# Patient Record
Sex: MALE | Race: WHITE | NOT HISPANIC OR LATINO | URBAN - METROPOLITAN AREA
[De-identification: names, ages, dates, MRNs, and addresses within clinical notes are randomized per-mention and may not be internally consistent; named-entity substitution may affect disease eponyms.]

---

## 2019-11-29 ENCOUNTER — EMERGENCY (EMERGENCY)
Facility: HOSPITAL | Age: 54
LOS: 1 days | Discharge: HOME | End: 2019-11-29
Attending: EMERGENCY MEDICINE
Payer: MEDICARE

## 2019-11-29 VITALS
SYSTOLIC BLOOD PRESSURE: 136 MMHG | RESPIRATION RATE: 17 BRPM | HEART RATE: 98 BPM | TEMPERATURE: 98 F | OXYGEN SATURATION: 97 % | DIASTOLIC BLOOD PRESSURE: 98 MMHG

## 2019-11-29 VITALS
WEIGHT: 259.93 LBS | DIASTOLIC BLOOD PRESSURE: 100 MMHG | RESPIRATION RATE: 17 BRPM | HEIGHT: 74 IN | OXYGEN SATURATION: 95 % | TEMPERATURE: 97 F | HEART RATE: 100 BPM | SYSTOLIC BLOOD PRESSURE: 152 MMHG

## 2019-11-29 PROCEDURE — 99282 EMERGENCY DEPT VISIT SF MDM: CPT

## 2019-11-29 PROCEDURE — 82962 GLUCOSE BLOOD TEST: CPT

## 2019-11-29 PROCEDURE — 99285 EMERGENCY DEPT VISIT HI MDM: CPT

## 2019-11-29 NOTE — ED PROVIDER NOTE - OBJECTIVE STATEMENT
54 year old male PMH HTN, PE on xarelto, s/p appe 2 weeks ago coming in with oxycodone overdose. Pt states he took hald an oxy that he had for post surgical pain, a hlaf a glass of wine and states he shaved and then fell asleep in the bathroom. states his friend who was with him had a difficult time waking him up so called EMS. pt states he didn't fall at all, has no complaints at this time. States he told EMS he didn't want to come at all. Pt also states didn't sleep last night since he is taking care of friend whose   recently.

## 2019-11-29 NOTE — ED PROVIDER NOTE - PATIENT PORTAL LINK FT
You can access the FollowMyHealth Patient Portal offered by [facility] by registering at the following website: [email or web address]. By joining [employer]’s FollowMyHealth portal, you will also be able to view your health information using other applications (apps) compatible with our system.

## 2019-11-29 NOTE — ED PROVIDER NOTE - CLINICAL SUMMARY MEDICAL DECISION MAKING FREE TEXT BOX
54 year old male with accidental overdose of oxycodone. vitals WNL. PE as above.  pt with no complaints at this time. doesn't want any workup. no signs of trauma on exam. normal neuro exam. ambulatory with normal gait. will dc. f/u PMD. return precautions given.

## 2019-11-29 NOTE — ED PROVIDER NOTE - PHYSICAL EXAMINATION
no signs of trauma to head/neck/trunk/or extremities.  PERRLA. EOMI. AAOx4  ambulatory in ED with normal gait.

## 2021-04-30 ENCOUNTER — EMERGENCY (EMERGENCY)
Facility: HOSPITAL | Age: 56
LOS: 1 days | Discharge: LEFT BEFORE DISCHARGE | End: 2021-04-30
Attending: EMERGENCY MEDICINE
Payer: MEDICARE

## 2021-04-30 VITALS
TEMPERATURE: 98 F | HEIGHT: 74 IN | OXYGEN SATURATION: 96 % | SYSTOLIC BLOOD PRESSURE: 162 MMHG | HEART RATE: 101 BPM | RESPIRATION RATE: 17 BRPM | DIASTOLIC BLOOD PRESSURE: 102 MMHG

## 2021-04-30 PROCEDURE — 99283 EMERGENCY DEPT VISIT LOW MDM: CPT

## 2021-04-30 NOTE — ED PROVIDER NOTE - CLINICAL SUMMARY MEDICAL DECISION MAKING FREE TEXT BOX
Discussed with patient at length about possible overdose reversed with Narcan even though patient adamantly denies using any drugs vs any medical conditions such as MI, cardiac arrythmia or others that may cause syncope or AMS. Patient states he had full evaluation by PMD and cardiologist at [facility]. Patient is asymptomatic and doesn't want to stay for further evaluation. Refuses/declines EKG, blood work, radiology studies, and admission. Patient alert and oriented x4. Understands risk of leaving AMA.

## 2021-04-30 NOTE — ED PROVIDER NOTE - NSFOLLOWUPCLINICS_GEN_ALL_ED_FT
[facility]  [facility]  [address] - [address]nd [place] [address]  Phone: [phone]  Fax: [phone]

## 2021-04-30 NOTE — ED PROVIDER NOTE - PHYSICAL EXAMINATION
In ER, vital signs with heart rate of 101, blood pressure of 162/102, otherwise exam within normal limits.

## 2021-04-30 NOTE — ED ADULT TRIAGE NOTE - CHIEF COMPLAINT QUOTE
unresponsive and lethargic, given narcan 2mg IV, patient alert and awake now. denies any suicidal ideation.
Walk in

## 2021-04-30 NOTE — ED PROVIDER NOTE - NSFOLLOWUPINSTRUCTIONS_ED_ALL_ED_FT
You decided to leave against medical advice even though you understood the risks of doing [so, including heart attach, fatal cardiac arrhythmia, permanent disability and death.   Take meds as prescribed by your doctor.  Follow up with your doctor/cardiologist or in the Clinic as discussed within 1 week.  Return to the ER for any concerns at any time.

## 2021-04-30 NOTE — ED ADULT NURSE NOTE - ED STAT RN HANDOFF DETAILS
pt is alert times 3 , signed AMA - pt  verbalizes risks for leaving and benefits of staying - pt signed AMA

## 2021-04-30 NOTE — ED ADULT NURSE NOTE - CHIEF COMPLAINT QUOTE
unresponsive and lethargic, given narcan 2mg IV, patient alert and awake now. denies any suicidal ideation.

## 2021-04-30 NOTE — ED PROVIDER NOTE - OBJECTIVE STATEMENT
55 y/o M pt with a history of PE on Xarelto, HTN, obesity, had recent ER visit at [facility] for what sounds like cervical radiculopathy as per pt treated with opioid shot in ER x2 days ago, and was prescribed ibuprofen and tizanidine presents for LOC. Pt states he was walking out of [employer] when he felt lightheaded and had LOC. As per bystanders, and EMS patient was found to be diaphoretic, bradypneic/ cyanotic, with pinpoint pupils, altered with normal heart rate and blood pressure. Patient received 2mg of Narcan with immediate improvement of symptoms. Patient became asymptomatic. Adamantly denies any ingestion of drugs or alcohol, except the prescribed Ibuprofen and Tizanidine.

## 2021-04-30 NOTE — ED ADULT NURSE NOTE - OBJECTIVE STATEMENT
as per pt " I came out the movies at [facility], got dizzy , I was leaning against the pole , pt is alert times 3 at present , wasn't s to go home denies any SI or HI

## 2021-04-30 NOTE — ED PROVIDER NOTE - PATIENT PORTAL LINK FT
You can access the FollowMyHealth Patient Portal offered by [facility] by registering at the following website: [email or web address]. By joining [employer]’s FollowMyHealth portal, you will also be able to view your health information using other applications (apps) compatible with our system.

## 2022-02-22 ENCOUNTER — EMERGENCY (EMERGENCY)
Facility: HOSPITAL | Age: 57
LOS: 1 days | Discharge: HOME | End: 2022-02-22
Attending: EMERGENCY MEDICINE | Admitting: EMERGENCY MEDICINE
Payer: MEDICARE

## 2022-02-22 VITALS
TEMPERATURE: 98 F | RESPIRATION RATE: 18 BRPM | OXYGEN SATURATION: 98 % | SYSTOLIC BLOOD PRESSURE: 162 MMHG | HEART RATE: 95 BPM | DIASTOLIC BLOOD PRESSURE: 98 MMHG

## 2022-02-22 VITALS
RESPIRATION RATE: 18 BRPM | HEART RATE: 95 BPM | OXYGEN SATURATION: 99 % | DIASTOLIC BLOOD PRESSURE: 99 MMHG | SYSTOLIC BLOOD PRESSURE: 142 MMHG | HEIGHT: 74 IN | TEMPERATURE: 98 F

## 2022-02-22 PROBLEM — I10 ESSENTIAL (PRIMARY) HYPERTENSION: Chronic | Status: ACTIVE | Noted: 2021-05-02

## 2022-02-22 LAB
ALBUMIN SERPL ELPH-MCNC: 3.7 G/DL — SIGNIFICANT CHANGE UP (ref 3.3–5)
ALP SERPL-CCNC: 94 U/L — SIGNIFICANT CHANGE UP (ref 40–120)
ALT FLD-CCNC: 10 U/L — SIGNIFICANT CHANGE UP (ref 4–41)
ANION GAP SERPL CALC-SCNC: 9 MMOL/L — SIGNIFICANT CHANGE UP (ref 7–14)
AST SERPL-CCNC: 11 U/L — SIGNIFICANT CHANGE UP (ref 4–40)
BASOPHILS # BLD AUTO: 0.05 K/UL — SIGNIFICANT CHANGE UP (ref 0–0.2)
BASOPHILS NFR BLD AUTO: 0.7 % — SIGNIFICANT CHANGE UP (ref 0–2)
BILIRUB SERPL-MCNC: 0.3 MG/DL — SIGNIFICANT CHANGE UP (ref 0.2–1.2)
BUN SERPL-MCNC: 18 MG/DL — SIGNIFICANT CHANGE UP (ref 7–23)
CALCIUM SERPL-MCNC: 9.2 MG/DL — SIGNIFICANT CHANGE UP (ref 8.4–10.5)
CHLORIDE SERPL-SCNC: 102 MMOL/L — SIGNIFICANT CHANGE UP (ref 98–107)
CO2 SERPL-SCNC: 28 MMOL/L — SIGNIFICANT CHANGE UP (ref 22–31)
CREAT SERPL-MCNC: 1 MG/DL — SIGNIFICANT CHANGE UP (ref 0.5–1.3)
EOSINOPHIL # BLD AUTO: 0.29 K/UL — SIGNIFICANT CHANGE UP (ref 0–0.5)
EOSINOPHIL NFR BLD AUTO: 4 % — SIGNIFICANT CHANGE UP (ref 0–6)
GLUCOSE SERPL-MCNC: 120 MG/DL — HIGH (ref 70–99)
HCT VFR BLD CALC: 36.2 % — LOW (ref 39–50)
HGB BLD-MCNC: 11.2 G/DL — LOW (ref 13–17)
IANC: 5.16 K/UL — SIGNIFICANT CHANGE UP (ref 1.5–8.5)
IMM GRANULOCYTES NFR BLD AUTO: 0.3 % — SIGNIFICANT CHANGE UP (ref 0–1.5)
LYMPHOCYTES # BLD AUTO: 1.18 K/UL — SIGNIFICANT CHANGE UP (ref 1–3.3)
LYMPHOCYTES # BLD AUTO: 16.2 % — SIGNIFICANT CHANGE UP (ref 13–44)
MCHC RBC-ENTMCNC: 27.4 PG — SIGNIFICANT CHANGE UP (ref 27–34)
MCHC RBC-ENTMCNC: 30.9 GM/DL — LOW (ref 32–36)
MCV RBC AUTO: 88.5 FL — SIGNIFICANT CHANGE UP (ref 80–100)
MONOCYTES # BLD AUTO: 0.58 K/UL — SIGNIFICANT CHANGE UP (ref 0–0.9)
MONOCYTES NFR BLD AUTO: 8 % — SIGNIFICANT CHANGE UP (ref 2–14)
NEUTROPHILS # BLD AUTO: 5.16 K/UL — SIGNIFICANT CHANGE UP (ref 1.8–7.4)
NEUTROPHILS NFR BLD AUTO: 70.8 % — SIGNIFICANT CHANGE UP (ref 43–77)
NRBC # BLD: 0 /100 WBCS — SIGNIFICANT CHANGE UP
NRBC # FLD: 0 K/UL — SIGNIFICANT CHANGE UP
PLATELET # BLD AUTO: 349 K/UL — SIGNIFICANT CHANGE UP (ref 150–400)
POTASSIUM SERPL-MCNC: 3.5 MMOL/L — SIGNIFICANT CHANGE UP (ref 3.5–5.3)
POTASSIUM SERPL-SCNC: 3.5 MMOL/L — SIGNIFICANT CHANGE UP (ref 3.5–5.3)
PROT SERPL-MCNC: 6.7 G/DL — SIGNIFICANT CHANGE UP (ref 6–8.3)
RBC # BLD: 4.09 M/UL — LOW (ref 4.2–5.8)
RBC # FLD: 17.9 % — HIGH (ref 10.3–14.5)
SODIUM SERPL-SCNC: 139 MMOL/L — SIGNIFICANT CHANGE UP (ref 135–145)
WBC # BLD: 7.28 K/UL — SIGNIFICANT CHANGE UP (ref 3.8–10.5)
WBC # FLD AUTO: 7.28 K/UL — SIGNIFICANT CHANGE UP (ref 3.8–10.5)

## 2022-02-22 PROCEDURE — 99284 EMERGENCY DEPT VISIT MOD MDM: CPT | Mod: GC

## 2022-02-22 RX ORDER — MORPHINE SULFATE 50 MG/1
2 CAPSULE, EXTENDED RELEASE ORAL ONCE
Refills: 0 | Status: DISCONTINUED | OUTPATIENT
Start: 2022-02-22 | End: 2022-02-22

## 2022-02-22 RX ORDER — CEPHALEXIN 500 MG
1 CAPSULE ORAL
Qty: 14 | Refills: 0
Start: 2022-02-22 | End: 2022-02-28

## 2022-02-22 RX ORDER — ACETAMINOPHEN 500 MG
975 TABLET ORAL ONCE
Refills: 0 | Status: COMPLETED | OUTPATIENT
Start: 2022-02-22 | End: 2022-02-22

## 2022-02-22 RX ORDER — CEPHALEXIN 500 MG
500 CAPSULE ORAL ONCE
Refills: 0 | Status: COMPLETED | OUTPATIENT
Start: 2022-02-22 | End: 2022-02-22

## 2022-02-22 RX ADMIN — Medication 500 MILLIGRAM(S): at 14:08

## 2022-02-22 RX ADMIN — MORPHINE SULFATE 2 MILLIGRAM(S): 50 CAPSULE, EXTENDED RELEASE ORAL at 13:17

## 2022-02-22 RX ADMIN — Medication 975 MILLIGRAM(S): at 13:17

## 2022-02-22 NOTE — ED PROVIDER NOTE - PHYSICAL EXAMINATION
GENERAL: A&Ox3, non-toxic appearing, no acute distress  HEENT: NCAT, EOMI, oral mucosa moist, normal conjunctiva  RESP: CTAB, no respiratory distress, no wheezes/rhonchi/rales, speaking in full sentences  CV: RRR, no murmurs/rubs/gallops, 2+ DP pulses, 2+ pitting edema  ABDOMEN: soft, non-tender, non-distended, no guarding, no CVA tenderness  MSK: no visible deformities  NEURO: no focal sensory or motor deficits, CN 2-12 grossly intact  SKIN: warm, well perfused, diffuse redness and swelling of lower extremities distal to knee, 4cm ulceration w/ granulation tissue of lateral L calf, scattered superficial skin ulcerations of lower extremities  PSYCH: normal affect

## 2022-02-22 NOTE — ED PROVIDER NOTE - NSFOLLOWUPINSTRUCTIONS_ED_ALL_ED_FT
Cellulitis    Please  your antibiotics from the pharmacy and take them as prescribed. Continue taking until finished, even if you feel completely better. Inform your primary doctor if you experience rash, shortness of breath, abdominal pain, or diarrhea.     Follow up with vascular surgery. We will contact you to schedule an appointment.     WHAT YOU NEED TO KNOW:  Cellulitis is a skin infection caused by bacteria. Cellulitis may go away on its own or you may need treatment. Your healthcare provider may draw a [place] around the outside edges of your cellulitis. If your cellulitis spreads, your healthcare provider will see it outside of the [place].   Cellulitis    DISCHARGE INSTRUCTIONS:  Call 911 if:   •You have sudden trouble breathing or chest pain.  Seek care immediately if:   •Your wound gets larger and more painful.   •You feel a crackling under your skin when you touch it.  •You have purple dots or bumps on your skin, or you see bleeding under your skin.  •You have new swelling and pain in your legs.  •The red, warm, swollen area gets larger.  •You see red streaks coming from the infected area.  Contact your healthcare provider if:   •You have a fever.  •Your fever or pain does not go away or gets worse.  •The area does not get smaller after 2 days of antibiotics.  •Your skin is flaking or peeling off.  •You have questions or concerns about your condition or care.  Medicines:   •Antibiotics help treat the bacterial infection.   •NSAIDs, such as ibuprofen, help decrease swelling, pain, and fever. NSAIDs can cause stomach bleeding or kidney problems in certain people. If you take blood thinner medicine, always ask if NSAIDs are safe for you. Always read the medicine label and follow directions. Do not give these medicines to children under 6 months of age without direction from your child's healthcare provider.  •Acetaminophen decreases pain and fever. It is available without a doctor's order. Ask how much to take and how often to take it. Follow directions. Read the labels of all other medicines you are using to see if they also contain acetaminophen, or ask your doctor or pharmacist. Acetaminophen can cause liver damage if not taken correctly. Do not use more than 4 grams (4,000 milligrams) total of acetaminophen in one day.   •Take your medicine as directed. Contact your healthcare provider if you think your medicine is not helping or if you have side effects. Tell him or her if you are allergic to any medicine. Keep a list of the medicines, vitamins, and herbs you take. Include the amounts, and when and why you take them. Bring the list or the pill bottles to follow-up visits. Carry your medicine list with you in case of an emergency.  Self-care:   •Elevate the area above the level of your heart as often as you can. This will help decrease swelling and pain. Prop the area on pillows or blankets to keep it elevated comfortably.   •Clean the area daily until the wound scabs over. Gently wash the area with soap and water. Pat dry. Use dressings as directed.   •Place cool or warm, wet cloths on the area as directed. Use clean cloths and clean water. Leave it on the area until the cloth is room temperature. Pat the area dry with a clean, dry cloth. The cloths may help decrease pain.   Prevent cellulitis:   •Do not scratch bug bites or areas of injury. You increase your risk for cellulitis by scratching these areas.   •Do not share personal items, such as towels, clothing, and razors.   •Clean exercise equipment with germ-killing  before and after you use it.  •Wash your hands often. Use soap and water. Wash your hands after you use the bathroom, change a child's diapers, or sneeze. Wash your hands before you prepare or eat food. Use lotion to prevent dry, cracked skin.   Handwashing   •Wear pressure stockings as directed. You may be told to wear the stockings if you have peripheral edema. The stockings improve blood flow and decrease swelling.  •Treat athlete’s foot. This can help prevent the spread of a bacterial skin infection.  Follow up with your healthcare provider within 3 days, or as directed: Your healthcare provider will check if your cellulitis is getting better. You may need different medicine. Write down your questions so you remember to ask them during your visits.

## 2022-02-22 NOTE — PROVIDER CONTACT NOTE (OTHER) - ASSESSMENT
Pt is returning to [id number] [place] [address]. SW arranged ambulance via [facility] ([phone]) trip id: [id number], : 3:00pm. No further SW needs at this time.

## 2022-02-22 NOTE — ED PROVIDER NOTE - MUSCULOSKELETAL, MLM
Spine appears normal, range of motion is not limited, no muscle or joint tenderness. + distal pulses.

## 2022-02-22 NOTE — ED PROVIDER NOTE - NS ED ROS FT
GENERAL: no fever, +chills  EYES: no change in vision, no irritation, no discharge, no redness, no pain  HEENT: no trouble swallowing or speaking, no throat pain, no ear pain  CARDIAC: no chest pain, no palpitations   PULMONARY: no cough, no shortness of breath, no wheezing  GI: no abdominal pain, no nausea, no vomiting, no diarrhea, no constipation  : no changes in urination, no dysuria  SKIN: no rashes  NEURO: no headache, no numbness, no weakness  MSK: no joint pain, no muscle pain, no back pain, no calf pain

## 2022-02-22 NOTE — ED PROVIDER NOTE - PROGRESS NOTE DETAILS
Dr. Jaimes: Discussed concern given chronicity and being b/l of PVD. Advised Vascular f/u. Legs were wrapped with sterile gauze and SW contacted to assist with transport home. Jh Jeffers, PGY3: Patient given Keflex for c/f overlying infection, however is less likely than venous stasis dermatitis. Will give single dose in ED and script sent to pharmacy. Patient referred to discharge lounge for vascular followup. Discussed return precautions and all questions answered. Pt in agreement w/ plan. CAOx3, NAD, VSS. Stable for d/c.

## 2022-02-22 NOTE — ED PROVIDER NOTE - SKIN, MLM
Erythema to b/l LE with open area to left LE, no active drainage with noted granulation tissue and appears chronic consistent with PVD

## 2022-02-22 NOTE — ED PROVIDER NOTE - PATIENT PORTAL LINK FT
You can access the FollowMyHealth Patient Portal offered by [facility] by registering at the following website: [email or web address]. By joining [employer]’s FollowMyHealth portal, you will also be able to view your health information using other applications (apps) compatible with our system.

## 2022-02-22 NOTE — ED PROVIDER NOTE - OBJECTIVE STATEMENT
56 year old male PMH PE on Xarelto, HTN, obesity, presents to ED for wound check. Patient has twice weekly wound care nurse, sent in today by nurse for concern of infection of lower extremities. Patient states b/l LE redness and swelling with itching/burning pain. Has not seen vascular in many years. States the lesions are not improving. He endorses chills. He denies fever, weakness, or numbness/tingling.

## 2022-02-22 NOTE — ED PROVIDER NOTE - CLINICAL SUMMARY MEDICAL DECISION MAKING FREE TEXT BOX
Jh Jeffers, PGY3: 56 year old male p/w non-healing lower extremity wounds. B/l redness, itching, and burning pain. 4cm ulceration to L calf. +chills, no fever. Given b/l nature and itching/burning, dx favors venous stasis dermatitis rather than cellulitis. Will obtain screening labs to eval for leukocytosis, consider d/c on abx, otherwise would d/c w/ vascular surgery follow up.

## 2022-02-22 NOTE — ED ADULT NURSE NOTE - OBJECTIVE STATEMENT
Patient is a 56-year-old male, A&OX3, ambulatory Phx HTN sent in by home health nurse with concern for infection to b/l lower extremities. Pt with wounds in various stages of healing. 20 G placed in R hand. Labs sent. To be sent home on PO antibiotics. Safety being maintained. P/U at 1630 PM. IV DC. safety maintained throughout.

## 2022-03-17 PROBLEM — Z00.00 ENCOUNTER FOR PREVENTIVE HEALTH EXAMINATION: Status: ACTIVE | Noted: 2022-03-17

## 2022-03-18 ENCOUNTER — APPOINTMENT (OUTPATIENT)
Dept: VASCULAR SURGERY | Facility: CLINIC | Age: 57
End: 2022-03-18

## 2022-08-17 ENCOUNTER — EMERGENCY (EMERGENCY)
Facility: HOSPITAL | Age: 57
LOS: 1 days | Discharge: LEFT BEFORE DISCHARGE | End: 2022-08-17
Attending: EMERGENCY MEDICINE

## 2022-08-17 VITALS
RESPIRATION RATE: 19 BRPM | DIASTOLIC BLOOD PRESSURE: 74 MMHG | OXYGEN SATURATION: 98 % | TEMPERATURE: 98 F | HEART RATE: 107 BPM | HEIGHT: 74 IN | SYSTOLIC BLOOD PRESSURE: 118 MMHG

## 2022-08-17 PROCEDURE — L9991: CPT

## 2022-08-17 NOTE — ED ADULT TRIAGE NOTE - CHIEF COMPLAINT QUOTE
as per ems roommate called for fall pt slipped and fell unable to get up feeling weaker today,with bilateral legs cellulitis

## 2022-08-17 NOTE — ED ADULT NURSE NOTE - EXPLANATION OF PATIENT'S REASON FOR LEAVING
was able to talk to patient over the phone, patient verbalized that hes feeling better and on his way home with family member

## 2023-10-26 NOTE — ED PROVIDER NOTE - CCCP TRG CHIEF CMPLNT
Render In Strict Bullet Format?: No
Detail Level: Zone
Initiate Treatment: Cephalexin 500 mg
overdose

## 2023-10-26 NOTE — ED PROVIDER NOTE - NO SIGNIFICANT PAST SURGICAL HISTORY
HPI   Chief Complaint   Patient presents with    Hyperglycemia     Pt has hyperglycemia, pt is non complaint with medicine. Pt was in hospital yesterday and left AMA.        This is a 53-year-old male with a history of CHF, diabetes, hypertension, PAD status post left BKA and medication noncompliance presenting with hyperglycemia.  Patient was seen and admitted last night in the ED and left AMA.  Patient reports that he left to go home and get his  because he does not like the water in the hospital.  When patient's admission yesterday patient was found to have elevated troponin level into the 600 range with significantly elevated BNP and signs of pulmonary edema.  Patient currently denying any chest pain.  Patient still reports shortness of breath with exertion.  Reports orthopnea.  No fevers or chills.      History provided by:  Patient and medical records                      [place] Coma Scale Score: 15                  Patient History   Past Medical History:   Diagnosis Date    CHF (congestive heart failure) (CMS/[facility])     Diabetes mellitus (CMS/[facility])      History reviewed. No pertinent surgical history.  No family history on file.  Social History     Tobacco Use    Smoking status: Every Day     Packs/day: .5     Types: Cigarettes    Smokeless tobacco: Never   Substance Use Topics    Alcohol use: Never    Drug use: Never       Physical Exam   ED Triage Vitals [10/26/23 0200]   Temp Heart Rate Resp BP   36.2 °C (97.2 °F) 98 20 102/69      SpO2 Temp Source Heart Rate Source Patient Position   98 % Tympanic Monitor Sitting      BP Location FiO2 (%)     Right arm --       Physical Exam  Vitals and nursing note reviewed.   Constitutional:       General: He is not in acute distress.  HENT:      Head: Atraumatic.      Mouth/Throat:      Mouth: Mucous membranes are moist.      Pharynx: Oropharynx is clear.   Eyes:      Extraocular Movements: Extraocular movements intact.      Conjunctiva/sclera: Conjunctivae  normal.      Pupils: Pupils are equal, round, and reactive to light.   Cardiovascular:      Rate and Rhythm: Normal rate and regular rhythm.      Pulses: Normal pulses.   Pulmonary:      Effort: Pulmonary effort is normal. No respiratory distress.      Breath sounds: Normal breath sounds.   Abdominal:      General: There is no distension.      Palpations: Abdomen is soft.      Tenderness: There is no abdominal tenderness. There is no guarding or rebound.   Musculoskeletal:      Cervical back: Neck supple.      Comments: Left BKA   Skin:     General: Skin is warm and dry.   Neurological:      Mental Status: He is alert and oriented to person, place, and time. Mental status is at baseline.      Cranial Nerves: No cranial nerve deficit.      Sensory: No sensory deficit.      Motor: No weakness.   Psychiatric:         Mood and Affect: Mood normal.         Behavior: Behavior normal.         ED Course & MDM   Diagnoses as of 10/26/23 0629   Elevated troponin   Acute on chronic congestive heart failure, unspecified heart failure type (CMS/HCC)   Hyperglycemia     Labs Reviewed   CBC WITH AUTO DIFFERENTIAL - Abnormal       Result Value    WBC 13.1 (*)     nRBC 0.0      RBC 3.87 (*)     Hemoglobin 9.0 (*)     Hematocrit 27.3 (*)     MCV 71 (*)     MCH 23.3 (*)     MCHC 33.0      RDW 17.4 (*)     Platelets 357      MPV 10.4      Neutrophils % 81.6      Immature Granulocytes %, Automated 0.4      Lymphocytes % 9.1      Monocytes % 8.8      Eosinophils % 0.0      Basophils % 0.1      Neutrophils Absolute 10.73 (*)     Immature Granulocytes Absolute, Automated 0.05      Lymphocytes Absolute 1.20      Monocytes Absolute 1.15 (*)     Eosinophils Absolute 0.00      Basophils Absolute 0.01     COMPREHENSIVE METABOLIC PANEL - Abnormal    Glucose 577 (*)     Sodium 121 (*)     Potassium 5.1      Chloride 81 (*)     Bicarbonate 30      Anion Gap 15      Urea Nitrogen 64 (*)     Creatinine 1.83 (*)     eGFR 44 (*)     Calcium 8.7       Albumin 3.5      Alkaline Phosphatase 104      Total Protein 6.5      AST 19      Bilirubin, Total 1.6 (*)     ALT 22     B-TYPE NATRIURETIC PEPTIDE - Abnormal    BNP 2,850 (*)     Narrative:        <100 pg/mL - Heart failure unlikely  100-299 pg/mL - Intermediate probability of acute heart                  failure exacerbation. Correlate with clinical                  context and patient history.    >=300 pg/mL - Heart Failure likely. Correlate with clinical                  context and patient history.    BNP testing is performed using different testing methodology at [facility] than at other [facility]. Direct result comparisons should only be made within the same method.      TROPONIN I, HIGH SENSITIVITY - Abnormal    Troponin I, High Sensitivity 1,590 (*)     Narrative:     Less than 99th percentile of normal range cutoff-  Female and children under 18 years old <14 ng/L; Male <21 ng/L: Negative  Repeat testing should be performed if clinically indicated.     Female and children under 18 years old 14-50 ng/L; Male 21-50 ng/L:  Consistent with possible cardiac damage and possible increased clinical   risk. Serial measurements may help to assess extent of myocardial damage.     >50 ng/L: Consistent with cardiac damage, increased clinical risk and  myocardial infarction. Serial measurements may help assess extent of   myocardial damage.      NOTE: Children less than 1 year old may have higher baseline troponin   levels and results should be interpreted in conjunction with the overall   clinical context.     NOTE: Troponin I testing is performed using a different   testing methodology at [facility] than at other   [facility]. Direct result comparisons should only   be made within the same method.   TROPONIN I, HIGH SENSITIVITY - Abnormal    Troponin I, High Sensitivity 1,417 (*)     Narrative:     Less than 99th percentile of normal range cutoff-  Female and children under 18  years old <14 ng/L; Male <21 ng/L: Negative  Repeat testing should be performed if clinically indicated.     Female and children under 18 years old 14-50 ng/L; Male 21-50 ng/L:  Consistent with possible cardiac damage and possible increased clinical   risk. Serial measurements may help to assess extent of myocardial damage.     >50 ng/L: Consistent with cardiac damage, increased clinical risk and  myocardial infarction. Serial measurements may help assess extent of   myocardial damage.      NOTE: Children less than 1 year old may have higher baseline troponin   levels and results should be interpreted in conjunction with the overall   clinical context.     NOTE: Troponin I testing is performed using a different   testing methodology at [facility] than at other   [facility] hospitals. Direct result comparisons should only   be made within the same method.   POCT GLUCOSE - Abnormal    POCT Glucose 506 (*)    POCT GLUCOSE - Abnormal    POCT Glucose 287 (*)    RESPIRATORY CULTURE/SMEAR   POCT GLUCOSE METER     XR chest 1 view   Final Result   Stable borderline heart size.  Clear lungs.  No evidence of infiltrate   or edema.   Signed by Yandel Slade MD          Medical Decision Making  This is a 53-year-old male with multiple medical comorbidities including CHF, diabetes that is poorly controlled.  Patient returning to the ED after leaving AMA from the hospital.  Patient's work-up reveals increasing troponin level to the 1500 range.  Patient continues to deny active chest pain.  ECG without evidence of acute ischemia.  Plan to continue to trend troponin level and continue with diuresis.  Patient given Lasix 40 mg IV.  Patient's chest x-ray today appears to be slightly improved compared to yesterday.  Patient's hyperglycemia was treated with insulin 10 units IV with improvement to 287.    Amount and/or Complexity of Data Reviewed  Labs: ordered. Decision-making details documented in ED Course.     Details:  Labwork reviewed.  CBC with mild leukocytosis of 13.1.  Hemoglobin with fairly stable anemia, hemoglobin of 9.0.  Metabolic panel with hyperglycemia, glucose of 577.  No anion gap acidosis.  Creatinine 1.83 with GFR 44.  BNP significantly elevated at 2850.  Initial troponin significantly elevated at 1590 with repeat downtrending at 1470.  Radiology: ordered and independent interpretation performed. Decision-making details documented in ED Course.     Details: Single view chest x-ray per my interpretation with cardiomegaly, slightly improved interstitial infiltrates compared to prior chest x-ray.  ECG/medicine tests: ordered and independent interpretation performed. Decision-making details documented in ED Course.     Details: Twelve-lead ECG obtained at 0222 by my interpretation demonstrates sinus tachycardia with a rate of 113, right bundle branch block, no STEMI  Discussion of management or test interpretation with external provider(s): Case discussed with medicine for admission.        Procedure  Procedures     Jamey Xavier MD  10/26/23 0649     <<----- Click to add NO significant Past Surgical History

## 2024-12-02 NOTE — ED ADULT NURSE NOTE - NS ED NOTE  TALK SOMEONE YN
[Normal Appearance] : normal appearance [Well Groomed] : well groomed [General Appearance - In No Acute Distress] : no acute distress [Edema] : no peripheral edema No [Respiration, Rhythm And Depth] : normal respiratory rhythm and effort [Exaggerated Use Of Accessory Muscles For Inspiration] : no accessory muscle use [Abdomen Soft] : soft [Abdomen Tenderness] : non-tender [Costovertebral Angle Tenderness] : no ~M costovertebral angle tenderness [Urinary Bladder Findings] : the bladder was normal on palpation [Normal Station and Gait] : the gait and station were normal for the patient's age [] : no rash [No Focal Deficits] : no focal deficits [Oriented To Time, Place, And Person] : oriented to person, place, and time [Affect] : the affect was normal [Mood] : the mood was normal [No Palpable Adenopathy] : no palpable adenopathy
